# Patient Record
Sex: MALE | Race: WHITE | NOT HISPANIC OR LATINO | Employment: STUDENT | URBAN - METROPOLITAN AREA
[De-identification: names, ages, dates, MRNs, and addresses within clinical notes are randomized per-mention and may not be internally consistent; named-entity substitution may affect disease eponyms.]

---

## 2021-01-29 ENCOUNTER — OFFICE VISIT (OUTPATIENT)
Dept: URGENT CARE | Facility: CLINIC | Age: 21
End: 2021-01-29
Payer: COMMERCIAL

## 2021-01-29 VITALS
TEMPERATURE: 98.3 F | DIASTOLIC BLOOD PRESSURE: 89 MMHG | HEART RATE: 82 BPM | RESPIRATION RATE: 16 BRPM | HEIGHT: 72 IN | WEIGHT: 295 LBS | OXYGEN SATURATION: 98 % | SYSTOLIC BLOOD PRESSURE: 138 MMHG | BODY MASS INDEX: 39.96 KG/M2

## 2021-01-29 DIAGNOSIS — H60.331 ACUTE SWIMMER'S EAR OF RIGHT SIDE: Primary | ICD-10-CM

## 2021-01-29 PROCEDURE — G0382 LEV 3 HOSP TYPE B ED VISIT: HCPCS | Performed by: EMERGENCY MEDICINE

## 2021-01-29 RX ORDER — NEOMYCIN SULFATE, POLYMYXIN B SULFATE AND HYDROCORTISONE 10; 3.5; 1 MG/ML; MG/ML; [USP'U]/ML
4 SUSPENSION/ DROPS AURICULAR (OTIC) 4 TIMES DAILY
Qty: 10 ML | Refills: 0 | Status: SHIPPED | OUTPATIENT
Start: 2021-01-29 | End: 2021-02-05

## 2021-01-29 NOTE — PATIENT INSTRUCTIONS
Otitis Externa   WHAT YOU NEED TO KNOW:   Otitis externa, or swimmer's ear, is an infection in the outer ear canal  This canal goes from the outside of the ear to the eardrum  DISCHARGE INSTRUCTIONS:   Return to the emergency department if:   · You have severe ear pain  · You are suddenly unable to hear at all  · You have new swelling in your face, behind your ears, or in your neck  · You suddenly cannot move part of your face  · Your face suddenly feels numb  Contact your healthcare provider if:   · You have a fever  · Your signs and symptoms do not get better after 2 days of treatment  · Your signs and symptoms go away for a time, but then come back  · You have questions or concerns about your condition or care  Medicines:   · NSAIDs , such as ibuprofen, help decrease swelling, pain, and fever  This medicine is available with or without a doctor's order  NSAIDs can cause stomach bleeding or kidney problems in certain people  If you take blood thinner medicine, always ask if NSAIDs are safe for you  Always read the medicine label and follow directions  Do not give these medicines to children under 10months of age without direction from your child's healthcare provider  · Acetaminophen  decreases pain and fever  It is available without a doctor's order  Ask how much to take and how often to take it  Follow directions  Acetaminophen can cause liver damage if not taken correctly  · Ear drops  that contain an antibiotic may be given  The antibiotic helps treat a bacterial infection  You may also be given steroid medicine  The steroid helps decrease redness, swelling, and pain  · Take your medicine as directed  Contact your healthcare provider if you think your medicine is not helping or if you have side effects  Tell him or her if you are allergic to any medicine  Keep a list of the medicines, vitamins, and herbs you take   Include the amounts, and when and why you take them  Bring the list or the pill bottles to follow-up visits  Carry your medicine list with you in case of an emergency  Follow up with your healthcare provider as directed:  Write down your questions so you remember to ask them during your visits  How to use eardrops:   · Lie down on your side with your infected ear facing up  · Carefully drip the correct number of eardrops into your ear  Have another person help you if possible  · Gently move the outside part of your ear back and forth to help the medicine reach your ear canal      · Stay lying down in the same position (with your ear facing up) for 3 to 5 minutes  Prevent otitis externa:   · Do not put cotton swabs or foreign objects in your ears  · Wrap a clean moist washcloth around your finger, and use it to clean your outer ear and remove extra ear wax  · Use ear plugs when you swim  Dry your outer ears completely after you swim or bathe  © Copyright [address] Information is for End User's use only and may not be sold, redistributed or otherwise used for commercial purposes  All illustrations and images included in CareNotes® are the copyrighted property of [employer]  or [facility] Aurea German   The above information is an  only  It is not intended as medical advice for individual conditions or treatments  Talk to your doctor, nurse or pharmacist before following any medical regimen to see if it is safe and effective for you

## 2021-01-29 NOTE — PROGRESS NOTES
[facility] [facility]        NAME: Tammy Mo is a 23 y o  male  : 2000    MRN: [id number]  DATE: 2021  TIME: 6:10 PM    Assessment and Plan   Acute swimmer's ear of right side [H60 331]  1  Acute swimmer's ear of right side  neomycin-polymyxin-hydrocortisone (CORTISPORIN) 0 35%-10,000 units/mL-1% otic suspension   After informed verbal consent, ear wick placed right canal, no complications  Patient Instructions     Patient Instructions     Otitis Externa   WHAT YOU NEED TO KNOW:   Otitis externa, or swimmer's ear, is an infection in the outer ear canal  This canal goes from the outside of the ear to the eardrum  DISCHARGE INSTRUCTIONS:   Return to the emergency department if:   · You have severe ear pain  · You are suddenly unable to hear at all  · You have new swelling in your face, behind your ears, or in your neck  · You suddenly cannot move part of your face  · Your face suddenly feels numb  Contact your healthcare provider if:   · You have a fever  · Your signs and symptoms do not get better after 2 days of treatment  · Your signs and symptoms go away for a time, but then come back  · You have questions or concerns about your condition or care  Medicines:   · NSAIDs , such as ibuprofen, help decrease swelling, pain, and fever  This medicine is available with or without a doctor's order  NSAIDs can cause stomach bleeding or kidney problems in certain people  If you take blood thinner medicine, always ask if NSAIDs are safe for you  Always read the medicine label and follow directions  Do not give these medicines to children under 10months of age without direction from your child's healthcare provider  · Acetaminophen  decreases pain and fever  It is available without a doctor's order  Ask how much to take and how often to take it  Follow directions  Acetaminophen can cause liver damage if not taken correctly      · Ear drops  that contain an antibiotic may be given  The antibiotic helps treat a bacterial infection  You may also be given steroid medicine  The steroid helps decrease redness, swelling, and pain  · Take your medicine as directed  Contact your healthcare provider if you think your medicine is not helping or if you have side effects  Tell him or her if you are allergic to any medicine  Keep a list of the medicines, vitamins, and herbs you take  Include the amounts, and when and why you take them  Bring the list or the pill bottles to follow-up visits  Carry your medicine list with you in case of an emergency  Follow up with your healthcare provider as directed:  Write down your questions so you remember to ask them during your visits  How to use eardrops:   · Lie down on your side with your infected ear facing up  · Carefully drip the correct number of eardrops into your ear  Have another person help you if possible  · Gently move the outside part of your ear back and forth to help the medicine reach your ear canal      · Stay lying down in the same position (with your ear facing up) for 3 to 5 minutes  Prevent otitis externa:   · Do not put cotton swabs or foreign objects in your ears  · Wrap a clean moist washcloth around your finger, and use it to clean your outer ear and remove extra ear wax  · Use ear plugs when you swim  Dry your outer ears completely after you swim or bathe  © Copyright [address] Information is for End User's use only and may not be sold, redistributed or otherwise used for commercial purposes  All illustrations and images included in CareNotes® are the copyrighted property of [employer]  or [address] [place], [place] [address]c[place]   The above information is an  only  It is not intended as medical advice for individual conditions or treatments  Talk to your doctor, nurse or pharmacist before following any medical regimen to see if it is safe and effective for you          Follow up with PCP in 3-5 days  Proceed to  ER if symptoms worsen  Chief Complaint     Chief Complaint   Patient presents with    Earache     right earache  attempted ear wax kit without improvement  has been going on for about a week  pain comes down into face as well  History of Present Illness       Patient complains of right ear pain for past week  Review of Systems   Review of Systems   Constitutional: Negative for chills and fever  HENT: Positive for ear pain  Negative for congestion, ear discharge, hearing loss, sinus pressure and sore throat  Respiratory: Negative for cough, shortness of breath and wheezing  Gastrointestinal: Negative for diarrhea and vomiting  Musculoskeletal: Negative for neck stiffness  Skin: Negative for pallor  Neurological: Negative for headaches  Current Medications       Current Outpatient Medications:     neomycin-polymyxin-hydrocortisone (CORTISPORIN) 0 35%-10,000 units/mL-1% otic suspension, Administer 4 drops to the right ear 4 (four) times a day for 7 days, Disp: 10 mL, Rfl: 0    Current Allergies     Allergies as of 01/29/2021    (No Known Allergies)            The following portions of the patient's history were reviewed and updated as appropriate: allergies, current medications, past family history, past medical history, past social history, past surgical history and problem list      Past Medical History:   Diagnosis Date    Known health problems: none        Past Surgical History:   Procedure Laterality Date    NO PAST SURGERIES         History reviewed  No pertinent family history  Medications have been verified  Objective   /89   Pulse 82   Temp 98 3 °F (36 8 °C)   Resp 16   Ht 6' (1 829 m)   Wt 134 kg (295 lb)   SpO2 98%   BMI 40 01 kg/m²        Physical Exam     Physical Exam  Vitals signs and nursing note reviewed  Constitutional:       General: He is not in acute distress  Appearance: He is well-developed  HENT:      Right Ear: No tenderness  No middle ear effusion  Tympanic membrane is not erythematous  Left Ear: No decreased hearing noted  No tenderness  A middle ear effusion is present  Tympanic membrane is not erythematous  Ears:      Comments: Right canal red, swollen, tender, TMs normal      Nose: Nose normal       Mouth/Throat:      Pharynx: No oropharyngeal exudate  Neck:      Musculoskeletal: Neck supple  Skin:     General: Skin is warm and dry  Findings: No rash  Neurological:      Mental Status: He is alert  Psychiatric:         Behavior: Behavior normal          Thought Content:  Thought content normal          Judgment: Judgment normal